# Patient Record
Sex: MALE | Race: WHITE | Employment: UNEMPLOYED | URBAN - NONMETROPOLITAN AREA
[De-identification: names, ages, dates, MRNs, and addresses within clinical notes are randomized per-mention and may not be internally consistent; named-entity substitution may affect disease eponyms.]

---

## 2023-06-26 ENCOUNTER — TELEPHONE (OUTPATIENT)
Dept: FAMILY MEDICINE CLINIC | Age: 73
End: 2023-06-26

## 2023-06-27 DIAGNOSIS — M43.16 SPONDYLOLISTHESIS OF LUMBAR REGION: Primary | ICD-10-CM

## 2023-06-27 DIAGNOSIS — M51.36 LUMBAR DEGENERATIVE DISC DISEASE: ICD-10-CM

## 2023-06-27 DIAGNOSIS — M47.24 OSTEOARTHRITIS OF SPINE WITH RADICULOPATHY, THORACIC REGION: ICD-10-CM

## 2023-07-05 ENCOUNTER — TELEPHONE (OUTPATIENT)
Dept: FAMILY MEDICINE CLINIC | Age: 73
End: 2023-07-05

## 2023-07-05 DIAGNOSIS — M43.10 RETROLISTHESIS: ICD-10-CM

## 2023-07-05 DIAGNOSIS — M51.36 DDD (DEGENERATIVE DISC DISEASE), LUMBAR: ICD-10-CM

## 2023-07-05 DIAGNOSIS — R20.2 PARESTHESIA: Primary | ICD-10-CM

## 2023-07-05 NOTE — TELEPHONE ENCOUNTER
Pt's wife calling asking for a referral to Neurology said that he is still having the pain at the waist line. They read an article about trapped nerves.         [place] [phone]

## 2023-07-14 NOTE — PROGRESS NOTES
Patient Name: Angella Davis : 1950        Date: 2023      Type of Appt: Consult    Reason for appt: Paresthesia,  - Retrolisthesis,  - DDD (degenerative disc disease), lumbar. MRI AT [facility] . HE IS AWARE TO BRING THE [address] [place] [address]. TO THE APPOINTMENT    Referred by:  Dr. Len Hamman, dermatology    Studies done: ??? MRI @ [facility]- remind ptb disc  6/15/23- Xray L/S, T/S @ [facility]     Smoking: No    REVIEW OF SYSTEMS: Abdominal skin sensitivity                         [facility]) [facility]  Neurosurgery [facility] Pain [address] , [address] [place] [address], [address] [place] [address] [place]: [phone]  F: [phone]          Patient:  Angella Davis  YOB: 1950  Date: 2023    The patient is a 81 y.o. male who presents today for evaluation of the following problems:     Chief Complaint   Patient presents with    Abdominal Pain     Right side     Leg Pain     Left side only sciatica pain         Referred by Len Hamman, MD      PAST MEDICAL, FAMILY AND SOCIAL HISTORY:  No past medical history on file.   Past Surgical History:   Procedure Laterality Date    ANKLE SURGERY       Family History   Problem Relation Age of Onset    Hypertension Mother     Diabetes Mother     Cancer Father      Current Outpatient Medications on File Prior to Visit   Medication Sig Dispense Refill    Multiple Vitamins-Minerals ([facility] DAILY MULTIPLE FOR MEN 50+ PO) Take by mouth      Multiple Vitamin (MULTIVITAMIN, BARIATRIC FUSION COMPLETE, CHEW TAB) Take by mouth      methylcellulose 500 MG TABS Take by mouth      mupirocin (BACTROBAN) 2 % cream Apply topically      sildenafil (VIAGRA) 100 MG tablet Take 1 tablet by mouth as needed      meloxicam (MOBIC) 7.5 MG tablet Take 1 tablet by mouth as needed for Pain      gabapentin (NEURONTIN) 300 MG capsule Intended supply: 30 days  Pt will start with tab nightly for 2-3 night and then may increase to 3 times daily if needed 90 capsule 0     No

## 2023-07-25 ENCOUNTER — INITIAL CONSULT (OUTPATIENT)
Dept: NEUROSURGERY | Age: 73
End: 2023-07-25
Payer: MEDICARE

## 2023-07-25 VITALS
TEMPERATURE: 97.6 F | HEIGHT: 70 IN | SYSTOLIC BLOOD PRESSURE: 136 MMHG | BODY MASS INDEX: 25.77 KG/M2 | WEIGHT: 180 LBS | DIASTOLIC BLOOD PRESSURE: 80 MMHG

## 2023-07-25 DIAGNOSIS — M54.17 LUMBOSACRAL RADICULOPATHY: ICD-10-CM

## 2023-07-25 DIAGNOSIS — M79.2 NEURITIS: Primary | ICD-10-CM

## 2023-07-25 PROCEDURE — G8427 DOCREV CUR MEDS BY ELIG CLIN: HCPCS | Performed by: NEUROLOGICAL SURGERY

## 2023-07-25 PROCEDURE — G8419 CALC BMI OUT NRM PARAM NOF/U: HCPCS | Performed by: NEUROLOGICAL SURGERY

## 2023-07-25 PROCEDURE — 99202 OFFICE O/P NEW SF 15 MIN: CPT | Performed by: NEUROLOGICAL SURGERY

## 2023-07-25 ASSESSMENT — ENCOUNTER SYMPTOMS
EYE PAIN: 0
BACK PAIN: 1
SHORTNESS OF BREATH: 0
NAUSEA: 0

## 2023-12-27 ENCOUNTER — TELEPHONE (OUTPATIENT)
Dept: FAMILY MEDICINE CLINIC | Age: 73
End: 2023-12-27

## 2024-05-03 ENCOUNTER — TELEPHONE (OUTPATIENT)
Dept: FAMILY MEDICINE CLINIC | Age: 74
End: 2024-05-03

## 2024-08-13 ENCOUNTER — TELEPHONE (OUTPATIENT)
Dept: OTHER | Facility: CLINIC | Age: 74
End: 2024-08-13

## 2024-08-13 NOTE — TELEPHONE ENCOUNTER
Patient was outreached to as part of Population Health scheduling activity. Patient may be due for a visit with their primary care provider.    Outcome of call: PATIENT UNAVAILABLE - LEFT VOICEMAIL    [phone] (home)   LM H#, call office to schedule AWV